# Patient Record
Sex: MALE | Race: BLACK OR AFRICAN AMERICAN | NOT HISPANIC OR LATINO | Employment: FULL TIME | URBAN - METROPOLITAN AREA
[De-identification: names, ages, dates, MRNs, and addresses within clinical notes are randomized per-mention and may not be internally consistent; named-entity substitution may affect disease eponyms.]

---

## 2018-01-11 ENCOUNTER — NON-PROVIDER VISIT (OUTPATIENT)
Dept: URGENT CARE | Facility: CLINIC | Age: 18
End: 2018-01-11

## 2018-01-11 DIAGNOSIS — Z02.1 PRE-EMPLOYMENT DRUG SCREENING: ICD-10-CM

## 2018-01-11 LAB
AMP AMPHETAMINE: NORMAL
COC COCAINE: NORMAL
INT CON NEG: NORMAL
INT CON POS: NORMAL
MET METHAMPHETAMINES: NORMAL
OPI OPIATES: NORMAL
PCP PHENCYCLIDINE: NORMAL
POC DRUG COMMENT 753798-OCCUPATIONAL HEALTH: NORMAL
THC: NORMAL

## 2018-01-11 PROCEDURE — 80305 DRUG TEST PRSMV DIR OPT OBS: CPT | Performed by: FAMILY MEDICINE

## 2018-06-16 ENCOUNTER — HOSPITAL ENCOUNTER (EMERGENCY)
Facility: MEDICAL CENTER | Age: 18
End: 2018-06-16
Attending: EMERGENCY MEDICINE

## 2018-06-16 VITALS
WEIGHT: 109.79 LBS | SYSTOLIC BLOOD PRESSURE: 111 MMHG | RESPIRATION RATE: 20 BRPM | OXYGEN SATURATION: 99 % | HEART RATE: 89 BPM | BODY MASS INDEX: 15.72 KG/M2 | DIASTOLIC BLOOD PRESSURE: 54 MMHG | TEMPERATURE: 99.4 F | HEIGHT: 70 IN

## 2018-06-16 DIAGNOSIS — S01.511A LIP LACERATION, INITIAL ENCOUNTER: ICD-10-CM

## 2018-06-16 PROCEDURE — 99283 EMERGENCY DEPT VISIT LOW MDM: CPT

## 2018-06-16 PROCEDURE — 304999 HCHG REPAIR-SIMPLE/INTERMED LEVEL 1

## 2018-06-16 PROCEDURE — 303747 HCHG EXTRA SUTURE

## 2018-06-16 PROCEDURE — 304217 HCHG IRRIGATION SYSTEM

## 2018-06-16 PROCEDURE — 700101 HCHG RX REV CODE 250: Performed by: EMERGENCY MEDICINE

## 2018-06-16 RX ORDER — LIDOCAINE HYDROCHLORIDE AND EPINEPHRINE 10; 10 MG/ML; UG/ML
10 INJECTION, SOLUTION INFILTRATION; PERINEURAL ONCE
Status: COMPLETED | OUTPATIENT
Start: 2018-06-16 | End: 2018-06-16

## 2018-06-16 RX ADMIN — LIDOCAINE HYDROCHLORIDE,EPINEPHRINE BITARTRATE 10 ML: 10; .01 INJECTION, SOLUTION INFILTRATION; PERINEURAL at 23:00

## 2018-06-16 ASSESSMENT — PAIN SCALES - GENERAL: PAINLEVEL_OUTOF10: 0

## 2018-06-17 NOTE — ED TRIAGE NOTES
Ajzunilda Majano  18 y.o. male  Chief Complaint   Patient presents with   • T-5000 GLF     Pt reports falling while skateboarding. -LOC.    • Lip Laceration     to left upper lip       Pt amb to triage with steady gait for above complaint. Aprox 1 cm laceration noted. Bleeding controlled  Pt is alert and oriented, speaking in full sentences, follows commands and responds appropriately to questions. NAD. Resp are even and unlabored.  Pt placed in lobby. Pt educated on triage process. Pt encouraged to alert staff for any changes.

## 2018-06-17 NOTE — ED PROVIDER NOTES
"ED Provider Note    CHIEF COMPLAINT  Laceration    HPI  Aj Majano is a 18 y.o. male who presents with complaint of a laceration on the left upper lip. He was skateboarding, wearing a helmet, and was trying to do a trick but fell striking his face on the ground. He denies any tooth pain. There is no headache. No loss of consciousness. No neck pain. He did not injure anything else and there is no chest pain or shortness of breath. No nausea or vomiting. No tooth pain. No extremity symptoms at all. There is no other complaint.    PAST MEDICAL HISTORY  None    SOCIAL HISTORY  He is here with friends denies any alcohol or drug use      ALLERGIES  No Known Allergies    REVIEW OF SYSTEMS  See HPI for further details. Review of systems as above, laceration as described.    PHYSICAL EXAM  VITAL SIGNS: /54   Pulse 89   Temp 37.4 °C (99.4 °F)   Resp 20   Ht 1.778 m (5' 10\")   Wt 49.8 kg (109 lb 12.6 oz)   SpO2 99%   BMI 15.75 kg/m²   Constitutional: Well appearing patient in no acute distress.  Not toxic, nor ill in appearance.  HENT: Mucus membranes moist. On the mouth, on the lateral aspect of the upper lip. He has a macerated laceration of the lip which goes through the vermilion border. There is also significant intraoral component separately.  Eyes: No scleral icterus.   Thorax & Lungs: Breathing easily on room air.  Skin: As above  Cardio: Distal pulses intact, skin is warm and well perfused, cap refill less than 2 seconds.  Neurologic: Distal strength and sensation intact.  Discriminate touch Is intact.  Psychiatric: Normal affect appropriate for the clinical situation.    LACERATION PROCEDURE NOTE  Verbal consent is obtained.  The skin is anesthetized using local infiltration 1% lidocaine with epinephrine.  The wound is irrigated with copious normal saline.  It is explored, no foreign bodies are seen or felt.  The wound is prepped with Betadine and draped in the usual sterile fashion.  Using a " 5-0 nylon, care is taken to realign the vermilion border. 2 simple interrupted sutures were placed. Then, attention was placed to the buccal aspect of the lip. A total of 5 sutures were placed to close the gaping wound using simple interrupted sutures with 5-0 rapid absorbing gut.      COURSE & MEDICAL DECISION MAKING  Patient presents after a laceration to the face while skateboarding. There is no evidence of head injury. He does have a laceration to the vermilion border which is reapproximated. Given my usual discussion about scars on the face. I will liken to return here in 4 or 5 days to remove those to nylon stitches. Should he have any new symptoms return to the worse she is to return to the ER. He is given instructions about this as well.    FINAL IMPRESSION  1. Lip laceration, initial encounter           This dictation was created using voice recognition software.    Electronically signed by: Du Pickering, 6/17/2018 12:26 AM

## 2018-06-17 NOTE — DISCHARGE INSTRUCTIONS
Keep moist with antibiotic ointment.  Soft food for next several days.  Those two stitches on the outside need to be removed in 4-5 days, the others will dissolve on their own.    Facial Laceration  A facial laceration is a cut on the face. These injuries can be painful and cause bleeding. Some cuts may need to be closed with stitches (sutures), skin adhesive strips, or wound glue. Cuts usually heal quickly but can leave a scar. It can take 1-2 years for the scar to go away completely.  Follow these instructions at home:  · Only take medicines as told by your doctor.  · Follow your doctor's instructions for wound care.  For Stitches:  · Keep the cut clean and dry.  · If you have a bandage (dressing), change it at least once a day. Change the bandage if it gets wet or dirty, or as told by your doctor.  · Wash the cut with soap and water 2 times a day. Rinse the cut with water. Pat it dry with a clean towel.  · Put a thin layer of medicated cream on the cut as told by your doctor.  · You may shower after the first 24 hours. Do not soak the cut in water until the stitches are removed.  · Have your stitches removed as told by your doctor.  · Do not wear any makeup until a few days after your stitches are removed.  For Skin Adhesive Strips:  · Keep the cut clean and dry.  · Do not get the strips wet. You may take a bath, but be careful to keep the cut dry.  · If the cut gets wet, pat it dry with a clean towel.  · The strips will fall off on their own. Do not remove the strips that are still stuck to the cut.  For Wound Glue:  · You may shower or take baths. Do not soak or scrub the cut. Do not swim. Avoid heavy sweating until the glue falls off on its own. After a shower or bath, pat the cut dry with a clean towel.  · Do not put medicine or makeup on your cut until the glue falls off.  · If you have a bandage, do not put tape over the glue.  · Avoid lots of sunlight or tanning lamps until the glue falls off.  · The glue  will fall off on its own in 5-10 days. Do not pick at the glue.  After Healing:  · Put sunscreen on the cut for the first year to reduce your scar.  Contact a doctor if:  · You have a fever.  Get help right away if:  · Your cut area gets red, painful, or puffy (swollen).  · You see a yellowish-white fluid (pus) coming from the cut.  This information is not intended to replace advice given to you by your health care provider. Make sure you discuss any questions you have with your health care provider.    Mouth Laceration  Introduction  A mouth laceration is a deep cut inside your mouth. The cut may go into your lip or go all of the way through your mouth and cheek. The cut may involve your tongue, the insides of your check, or the upper surface of your mouth (palate).  Mouth lacerations may bleed a lot and may need to be treated with stitches (sutures).  Follow these instructions at home:  · Take medicines only as told by your doctor.  · If you were prescribed an antibiotic medicine, finish all of it even if you start to feel better.  · Eat as told by your doctor. You may only be able to eat drink liquids or eat soft foods for a few days.  · Rinse your mouth with a warm, salt-water rinse 4-6 times per day or as told by your doctor. You can make a salt-water rinse by mixing one tsp of salt into two cups of warm water.  · Do not poke the sutures with your tongue. Doing that can loosen them.  · Check your wound every day for signs of infection. It is normal to have a white or gray patch over your wound while it heals. Watch for:  ¨ Redness.  ¨ Puffiness (swelling).  ¨ Blood or pus.  · Keep your mouth and teeth clean (oral hygiene) like you normally do, if possible. Gently brush your teeth with a soft, nylon-bristled toothbrush 2 times per day.  · Keep all follow-up visits as told by your doctor. This is important.  Contact a doctor if:  · You got a tetanus shot and you have swelling, really bad pain, redness, or  bleeding at the injection site.  · You have a fever.  · Medicine does not help your pain.  · You have redness, swelling, or pain at your wound that is getting worse.  · You have fresh bleeding or pus coming from your wound.  · The edges of your wound break open.  · Your neck or throat is puffy or tender.  Get help right away if:  · You have swelling in your face or the area under your jaw.  · You have trouble breathing or swallowing.  This information is not intended to replace advice given to you by your health care provider. Make sure you discuss any questions you have with your health care provider.  Document Released: 06/05/2009 Document Revised: 05/25/2017 Document Reviewed: 12/09/2015  © 2017 Elsevier    Document Released: 06/05/2009 Document Revised: 05/25/2017 Document Reviewed: 07/31/2014  Elsevier Interactive Patient Education © 2017 [employer]

## 2018-06-17 NOTE — ED NOTES
Skate board accident:   No loc,  Small lac to upper l lip.  Tetanus less than 2 years ago in [employer] School per the pt.  nka.  Hemostasis obtained.  Saline 4 x 4 to the upper lip.

## 2018-06-20 NOTE — DOCUMENTATION QUERY
DOCUMENTATION QUERY    PROVIDERS: Please select “Cosign w/ note”to reply to query.    To better represent the severity of illness of your patient, please review the following information and exercise your independent professional judgment in responding to this query.     A laceration repair is documented in the ER report. Based upon the clinical findings, risk factors, and treatment, please specify the length of the laceration that was repaired.        The medical record reflects the following:   Clinical Findings Lip Laceration   Treatment  Verbal consent is obtained.  The skin is anesthetized using local infiltration 1% lidocaine with epinephrine.  The wound is irrigated with copious normal saline.  It is explored, no foreign bodies are seen or felt.  The wound is prepped with Betadine and draped in the usual sterile fashion.  Using a 5-0 nylon, care is taken to realign the vermilion border. 2 simple interrupted sutures were placed. Then, attention was placed to the buccal aspect of the lip. A total of 5 sutures were placed to close the gaping wound using simple interrupted sutures with 5-0 rapid absorbing gut.   Risk Factors     Location within medical record  ER report     Thank you,   Davidson Landis, CCS

## 2018-06-22 NOTE — DOCUMENTATION QUERY
DOCUMENTATION QUERY     DR. LAZAR, please review this Documentation Query and reply with an addendum. You co-signed only, which does not address the needed clarification from you to be able to code and complete this account.       To better represent the severity of illness of your patient, please review the following information and exercise your independent professional judgment in responding to this query.      A laceration repair is documented in the ER report. Based upon the clinical findings, risk factors, and treatment, please specify the length of the laceration that was repaired.           The medical record reflects the following:   Clinical Findings Lip Laceration   Treatment  Verbal consent is obtained.  The skin is anesthetized using local infiltration 1% lidocaine with epinephrine.  The wound is irrigated with copious normal saline.  It is explored, no foreign bodies are seen or felt.  The wound is prepped with Betadine and draped in the usual sterile fashion.  Using a 5-0 nylon, care is taken to realign the vermilion border. 2 simple interrupted sutures were placed. Then, attention was placed to the buccal aspect of the lip. A total of 5 sutures were placed to close the gaping wound using simple interrupted sutures with 5-0 rapid absorbing gut.   Risk Factors     Location within medical record  ER report      Thank you,   Davidson Landis, CCS

## 2018-06-23 ENCOUNTER — HOSPITAL ENCOUNTER (EMERGENCY)
Facility: MEDICAL CENTER | Age: 18
End: 2018-06-23
Attending: EMERGENCY MEDICINE

## 2018-06-23 VITALS
BODY MASS INDEX: 16.44 KG/M2 | RESPIRATION RATE: 14 BRPM | SYSTOLIC BLOOD PRESSURE: 111 MMHG | DIASTOLIC BLOOD PRESSURE: 50 MMHG | HEIGHT: 70 IN | HEART RATE: 94 BPM | OXYGEN SATURATION: 97 % | TEMPERATURE: 99.5 F | WEIGHT: 114.86 LBS

## 2018-06-23 DIAGNOSIS — Z48.02 VISIT FOR SUTURE REMOVAL: ICD-10-CM

## 2018-06-23 PROCEDURE — 99282 EMERGENCY DEPT VISIT SF MDM: CPT

## 2018-06-23 ASSESSMENT — PAIN SCALES - GENERAL: PAINLEVEL_OUTOF10: 0

## 2018-06-24 NOTE — DISCHARGE INSTRUCTIONS
Suture Removal  You have had your sutures (stitches) removed today. This means your wound has healed well enough to take out your stitches. Be careful to protect the wound area over the next several weeks. An injury this area could cause the cut to split open again. It usually takes 1-2 years for a scar to get its full strength and loose its redness. For wounds that heal slowly, tapes may be applied to reinforce the skin for several days after the stitches are removed.  You may allow the sutured area to get wet. Topical antibiotics (antibiotics you put on your skin) are not usually needed at this point. Applying vitamin E oil and aloe vera ointments may help the wound heal faster and stronger. Some scars form extra pigment with exposure to sunlight during the first 6-12 months after repair. This can be prevented by using a sun block (SPF 15-30) on the affected area. Call your doctor if you have any concerns about your injury. Call right away if you have any evidence of wound infection such as increased pain, drainage, redness, or swelling.  Document Released: 01/25/2006 Document Revised: 03/11/2013 Document Reviewed: 10/09/2009  [employer]® Patient Information ©2013 [employer].

## 2018-06-24 NOTE — ED PROVIDER NOTES
"ED Provider Note    CHIEF COMPLAINT  Chief Complaint   Patient presents with   • Suture Removal     left lip       HPI  Aj Majano is a 18 y.o. male who presents for suture removal 7 days after having a laceration of his upper lip repaired.  No fever, he has no other specific complaints    REVIEW OF SYSTEMS  Negative for fever, rash     PAST MEDICAL HISTORY       SOCIAL HISTORY  Social History     Social History Main Topics   • Smoking status: Not on file   • Smokeless tobacco: Not on file   • Alcohol use Not on file   • Drug use: Unknown   • Sexual activity: Not on file       SURGICAL HISTORY  patient denies any surgical history    CURRENT MEDICATIONS  I personally reviewed the medication list in the charting documentation.     ALLERGIES  No Known Allergies    PHYSICAL EXAM  VITAL SIGNS: /50   Pulse 94   Temp 37.5 °C (99.5 °F) (Temporal)   Resp 14   Ht 1.778 m (5' 10\")   Wt 52.1 kg (114 lb 13.8 oz)   SpO2 97%   BMI 16.48 kg/m²   Constitutional: Alert in no apparent distress.  HENT: Healing laceration involving the left side of the upper lip with 2 sutures in place  Eyes: Conjunctiva normal, Non-icteric.   Chest: Normal nonlabored respirations  Skin: No erythema, No rash.   Musculoskeletal: Good range of motion in all major joints.   Neurologic: Alert, No focal deficits noted.   Psychiatric: Affect normal, Judgment normal.    COURSE & MEDICAL DECISION MAKING  Pertinent Labs & Imaging studies reviewed. (See chart for details)    Encounter Summary: This is a 18 y.o. male here for suture removal, 7 days after laceration sustained in his upper lip, I reviewed the note from the prior physician, 2 nylon sutures were placed, these were removed by nursing staff, stable and appropriate for discharge with good healing of this wound.      DISPOSITION: Discharge Home      FINAL IMPRESSION  1. Visit for suture removal        This dictation was created using voice recognition software. The accuracy of the " dictation is limited to the abilities of the software. I expect there may be some errors of grammar and possibly content. The nursing notes were reviewed and certain aspects of this information were incorporated into this note.    Electronically signed by: Dallin Monterroso, 6/23/2018 5:51 PM

## 2018-06-24 NOTE — ED TRIAGE NOTES
Chief Complaint   Patient presents with   • Suture Removal     left lip     Pt here for left upper lip suture removal. Dried blood clot over suture, unable to visualize suture. Pt to yellow 63